# Patient Record
(demographics unavailable — no encounter records)

---

## 2017-10-27 NOTE — RAD
TWO VIEWS CHEST:

 

10/27/2017

 

HISTORY:

Dyspnea.

 

COMPARISON:

09/11/2017

 

TECHNIQUE:

PA and lateral views of the chest obtained.

 

FINDINGS:

Two views of the chest demonstrate increased pulmonary vascular congestion, compared to the previous
 exam.  Cardiomegaly is seen.  No evidence of effusions seen.

 

IMPRESSION:

Pulmonary vascular congestion and diffuse interstitial markings.  This may represent changes of ana maría
estive heart failure.

 

There is also apparent dislocation of the right shoulder, suggesting a chronic right shoulder disloc
ation.

 

POS: [facility]